# Patient Record
Sex: FEMALE | Race: WHITE | NOT HISPANIC OR LATINO | Employment: OTHER | URBAN - METROPOLITAN AREA
[De-identification: names, ages, dates, MRNs, and addresses within clinical notes are randomized per-mention and may not be internally consistent; named-entity substitution may affect disease eponyms.]

---

## 2019-06-19 ENCOUNTER — APPOINTMENT (OUTPATIENT)
Dept: RADIOLOGY | Facility: IMAGING CENTER | Age: 79
End: 2019-06-19
Attending: NURSE PRACTITIONER
Payer: MEDICARE

## 2019-06-19 ENCOUNTER — OFFICE VISIT (OUTPATIENT)
Dept: URGENT CARE | Facility: CLINIC | Age: 79
End: 2019-06-19

## 2019-06-19 VITALS
HEART RATE: 98 BPM | DIASTOLIC BLOOD PRESSURE: 66 MMHG | TEMPERATURE: 97.3 F | WEIGHT: 114 LBS | SYSTOLIC BLOOD PRESSURE: 150 MMHG | OXYGEN SATURATION: 97 %

## 2019-06-19 DIAGNOSIS — S30.1XXA CONTUSION OF ABDOMINAL WALL, INITIAL ENCOUNTER: ICD-10-CM

## 2019-06-19 DIAGNOSIS — K59.00 CONSTIPATION, UNSPECIFIED CONSTIPATION TYPE: ICD-10-CM

## 2019-06-19 DIAGNOSIS — S39.91XA INJURY OF ABDOMEN, INITIAL ENCOUNTER: ICD-10-CM

## 2019-06-19 PROCEDURE — 99203 OFFICE O/P NEW LOW 30 MIN: CPT | Performed by: NURSE PRACTITIONER

## 2019-06-19 PROCEDURE — 74019 RADEX ABDOMEN 2 VIEWS: CPT | Mod: TC,FY | Performed by: NURSE PRACTITIONER

## 2019-06-19 RX ORDER — LISINOPRIL 10 MG/1
TABLET ORAL
Refills: 3 | COMMUNITY
Start: 2019-05-06 | End: 2021-08-21

## 2019-06-19 ASSESSMENT — ENCOUNTER SYMPTOMS
BACK PAIN: 1
LOSS OF CONSCIOUSNESS: 0
ABDOMINAL PAIN: 1

## 2019-06-19 NOTE — PROGRESS NOTES
Subjective:      Cassandra Daniel is a 78 y.o. female who presents with Rib Pain (fell over her walker x3days ago, left rib and lower back pain)            Fall   Incident onset: pt is accompanied by her granddaughter. pt reports she was walking around at home when she tripped and fell onto her walker. She reports the left side of her abdomen hit the handle of the walker. She reports tenderness and bruising. The fall occurred while walking. Impact surface: pt states she did not fall to the ground. Pain location: she admits her lower back is also sore on the left side. The symptoms are aggravated by pressure on injury. Associated symptoms include abdominal pain. Pertinent negatives include no loss of consciousness. She has tried acetaminophen and rest for the symptoms. The treatment provided mild (she reports the pain has improved since she first injured it) relief.       Review of Systems   Gastrointestinal: Positive for abdominal pain.   Musculoskeletal: Positive for back pain.   Neurological: Negative for loss of consciousness.   All other systems reviewed and are negative.    History reviewed. No pertinent past medical history. History reviewed. No pertinent surgical history.   Social History     Social History   • Marital status: Single     Spouse name: N/A   • Number of children: N/A   • Years of education: N/A     Occupational History   • Not on file.     Social History Main Topics   • Smoking status: Never Smoker   • Smokeless tobacco: Never Used   • Alcohol use No   • Drug use: Unknown   • Sexual activity: Not on file     Other Topics Concern   • Not on file     Social History Narrative   • No narrative on file          Objective:     /66   Pulse 98   Temp 36.3 °C (97.3 °F) (Temporal)   Wt 51.7 kg (114 lb)   SpO2 97%      Physical Exam   Constitutional: She is oriented to person, place, and time. Vital signs are normal. She appears well-developed and well-nourished.   HENT:   Head: Normocephalic  and atraumatic.   Eyes: Pupils are equal, round, and reactive to light. EOM are normal.   Neck: Normal range of motion.   Cardiovascular: Normal rate and regular rhythm.    Pulmonary/Chest: Effort normal.   Abdominal: Soft. Normal appearance and bowel sounds are normal.       Musculoskeletal: Normal range of motion.        Lumbar back: She exhibits tenderness.        Back:    Neurological: She is alert and oriented to person, place, and time.   Skin: Skin is warm and dry. Capillary refill takes less than 2 seconds.   Psychiatric: She has a normal mood and affect. Her speech is normal and behavior is normal. Thought content normal.   Vitals reviewed.         Xray: no acute fracture by my read. Constipation pattern. Radiology review pending.  6/19/2019 10:07 AM    HISTORY/REASON FOR EXAM:  Left-sided pelvic and left hip pain after fall out of walker 3 days ago..      TECHNIQUE/EXAM DESCRIPTION AND NUMBER OF VIEWS:  4 view(s) of the abdomen.    COMPARISON: None    FINDINGS:  No free air is identified.  There is an increased volume of stool throughout the colon.  Upright and bilateral bending views of the abdomen were obtained.  No lumbar compression fracture is identified.  No hip fracture or dislocation is identified.   Impression       1.  No lumbar or pelvic fracture identified.    2.  Constipation pattern of the colon.          Assessment/Plan:     1. Injury of abdomen, initial encounter  - [id number] VIEWS; Future    2. Contusion of abdominal wall, initial encounter    3. Constipation, unspecified constipation type    Discussed with patient this appears to be only a contusion of her abdomen. Likely also a back strain due to the unusual way she landed against her walker  Encouraged her to continue using tylenol for pain relief  Alternate ice and warm compresses   OTC stool softener of her choice to help relieve constipation  Increase water intake  Encouraged rest and supportive care  Supportive care, differential  diagnoses, and indications for immediate follow-up discussed with patient.    Pathogenesis of diagnosis discussed including typical length and natural progression.      Instructed to return to UC or nearest emergency department if symptoms fail to improve, for any change in condition, further concerns, or new concerning symptoms.  Patient states understanding of the plan of care and discharge instructions.

## 2021-01-15 DIAGNOSIS — Z23 NEED FOR VACCINATION: ICD-10-CM

## 2021-06-07 PROBLEM — Z91.81 HISTORY OF FALLING: Status: ACTIVE | Noted: 2021-06-07

## 2021-06-07 PROBLEM — G20.A1 PARKINSON DISEASE (HCC): Status: ACTIVE | Noted: 2021-06-07

## 2021-06-07 PROBLEM — R73.03 PREDIABETES: Status: ACTIVE | Noted: 2021-06-07

## 2021-06-07 PROBLEM — I95.9 LOW BLOOD PRESSURE: Status: ACTIVE | Noted: 2021-06-07

## 2021-06-07 PROBLEM — Z87.19 HISTORY OF BARRETT'S ESOPHAGUS: Status: ACTIVE | Noted: 2021-06-07

## 2021-06-25 PROBLEM — K21.9 GERD (GASTROESOPHAGEAL REFLUX DISEASE): Status: ACTIVE | Noted: 2021-06-25

## 2021-09-24 PROBLEM — M25.551 CHRONIC HIP PAIN, RIGHT: Status: ACTIVE | Noted: 2021-09-24

## 2021-09-24 PROBLEM — G89.29 CHRONIC HIP PAIN, RIGHT: Status: ACTIVE | Noted: 2021-09-24

## 2021-11-05 PROBLEM — R55 SYNCOPE: Status: ACTIVE | Noted: 2021-11-05

## 2021-11-05 PROBLEM — N39.0 UTI (URINARY TRACT INFECTION): Status: ACTIVE | Noted: 2021-11-05
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
## 2022-01-29 PROBLEM — S09.90XA CLOSED HEAD INJURY: Status: ACTIVE | Noted: 2022-01-29

## 2022-08-30 PROBLEM — R55 SYNCOPE: Status: RESOLVED | Noted: 2021-11-05 | Resolved: 2022-08-30

## 2022-08-30 PROBLEM — N39.0 UTI (URINARY TRACT INFECTION): Status: RESOLVED | Noted: 2021-11-05 | Resolved: 2022-08-30
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
## 2022-10-18 PROBLEM — S09.90XA CLOSED HEAD INJURY: Status: RESOLVED | Noted: 2022-01-29 | Resolved: 2022-10-18

## 2022-10-18 PROBLEM — K59.00 CONSTIPATION: Status: ACTIVE | Noted: 2022-10-18
